# Patient Record
Sex: FEMALE | Race: WHITE | NOT HISPANIC OR LATINO | Employment: FULL TIME | URBAN - METROPOLITAN AREA
[De-identification: names, ages, dates, MRNs, and addresses within clinical notes are randomized per-mention and may not be internally consistent; named-entity substitution may affect disease eponyms.]

---

## 2018-08-01 ENCOUNTER — TELEPHONE (OUTPATIENT)
Dept: HEMATOLOGY/ONCOLOGY | Facility: CLINIC | Age: 33
End: 2018-08-01

## 2018-08-01 NOTE — TELEPHONE ENCOUNTER
From: Alexia Cm   Sent: Friday, August 03, 2018 2:53 PM  To: Lindy Cochran <[email or web address]>; Alma Calixto <[email or web address]>; Toby Lara <[email or web address]>    Pt Melinda Mcarthur was approved for this Consult only. This is a second opinion to her insurance, per Perla.  Anything else the patient's insurance will pay 50%. Patient paid her $50.00 co-pay for service today.    ===========================================  From: Lindy Cochran   Sent: Thursday, August 02, 2018 4:26 PM  To: Alma Calixto <[email or web address]>  Cc: Alexia Cm [email or web address]>  Subject: RE: pre-Auth for Melinda Mcarthur #[id number]    I have talked to the patient, referral coordinator & doctor's nurse (and that's just this afternoon).  I explained to them our protocol.  I explained to the patient that this is how it will be for every  single  service.  Patient will discuss with  and let me know tomorrow.  It sounded like she is still inclined to come here even with requirement of $500 deposit.    --------------------- Message-----  From: Alma Calixto   Sent: Thursday, August 02, 2018 3:18 PM    Accepting a patient whose insurance is not contracted, requires every single service to get a separate approval #.  So we would need referrals like this for every service rendered or we will get a denial.  Patient is aware of center she is contracted with so if she chooses to come here, and does not have the required approvals we need, we must treat as a self-pay.    ---------------------- Message-----  From: Alexia Cm   Sent: Thursday, August 02, 2018 3:14 PM  To: Lindy Cocharn [email or web address]>; Alma Calixto <[email or web address]>  Subject: RE: pre-Auth for Melinda Mcarthur #[id number]    We need a name and reference number from the insurance company sense we know this is out of net benefits, this for consult service only if the  patient needs any more treatment we have to get a approval for that service as well. Without a name and reference number from the insurance company the patient will have to pay $500.00.    ---------------------Message-----  From: Lindy Cochran   Sent: Thursday, August 02, 2018 3:04 PM  To: Alma Calixto [email or web address]>  Cc: Alexia Cm [email or web address]>    Patient called back to report that she spoke with insurance (she didn't get the name) and was told tomorrow's visit is approved.    The bottom of the form does have a pre-auth # and signature...  Please advise    ----------------------Message-----  From: Alma Calixto   Sent: Thursday, August 02, 2018 3:06 PM  To: Lindy Cochran <[email or web address]>  Cc: Alexia Cm [email or web address]>  Subject: RE: pre-Auth for Melinda Mcarthur #[id number]    Alexia    Is the referral acceptable to you?  Usually it's on insurance letter head and states what services are approved, approval # and date range.  Please advise Lindy.  For transplant services, I could not accept an approval in this manner.    Thanks    Alma Calixto  [facility]     ------------------------Message-----  From: Lindy Cochran  Sent: Thursday, August 02, 2018 3:04 PM  To: Alma Calixto [email or web address]>  Cc: Alexia Cm <[email or web address]>    Patient called back to report that she spoke with insurance (she didn't get the name) and was told tomorrow's visit is approved.    The bottom of the form does have a pre-auth # and signature...  Please advise      ----------------------  Message-----  From: Lindy Cochran  Sent: Thursday, August 02, 2018 2:55 PM  To: Alma Calixto [email or web address]>  Cc: Alexia Cm <[email or web address]>  Subject: RE: pre-Auth for Melinda Mcarthur  #[id number]    Per our conversation, Alma, I spoke with the referring office and the patient.  Explained to them that we do not consider this as a guarantee of coverage.  Patient will be considered self-pay and will be required to deposit $500 upfront.  Patient will talk with insurance and make decision.    I will let you know when she gives me an update.      ------------------------- Message-----  From: Alma Calixto  Sent: Thursday, August 02, 2018 2:42 PM  To: [email or web address]>  Cc: Lindy Cochran <[email or web address]>; Alexia Cm [email or web address]>  Subject: RE: pre-Auth for Melinda Mcarthur #[id number]    This is not an approval, only a request to come to [facility].  If it is not approved, patient would be self pay for tomorrow's appt.  Alexia Cm assists with non transplant appts.  -----------------------------------------------------------------------    [Received fax of pre-auth form.  Forwarded to [facility]]    -----------------------------------------------------------------------  ----- -------------      Message from Alma Calixto sent at 8/1/2018  9:50 AM CDT -----    Yes all services would be out of network    ----- ---------------Message -----  From: Lindy Cochran RN  Sent: 8/1/2018   9:39 AM  To: Alma Calixto                                   So even if we just do 2nd opinion (no transplant), a clinic visit would be covered at an out-of-network level?    ----- ----------------Message -----  From: Alma Calixto  Sent: 8/1/2018   9:27 AM  To: BLACK Perdue  This plan in not contracted with [facility].  Per Jennifer ROSS, patient can contact them to ask for single case agreement to be allowed to come her, but her responsibility would be at a higher cost, due to being out of network.  I asked for list of centers that are contracted and Jennifer ROSS stated she can only disclose to  patient.  Alma    ----- ----------------Message -----  From: Lindy Cochran RN  Sent: 8/1/2018   9:18 AM  To: Alma Calixto  Subject: BMT eval                                         Alma Peralta,    pls confirm transplant benefits.  I entered insurance data and card is in media.    Thanks,  Lindy

## 2018-08-01 NOTE — LETTER
Fax Transmission                                                                                                                                                       Date: 2018    -- Slide Request--            for Dr Lara   To:  [facility] From:  Blood & Marrow Transplant              Navigator - Lindy   Fax:  [phone] Fax:       [phone]   Phone:  [phone] Phone:   [phone]     Patient:  ADRIANNE LUU        LEXIE  1985                Accession #: [id number]     Date Collected:  2018           Please send slides to:     [facility]     Leukemia, Blood & Marrow Transplant Program     3rd Floor     [facility]     Attn:  Transplant Coordinator     [address] [place] [address]  [id number]     [employer] Acct code:  [id number]                               Thank you            If there are any problems with this transmission, please call immediately. Thank you.    Confidentiality notice: The accompanying facsimile is intended solely for the use of the recipient designated above. Document(s) transmitted herewith may contain information that is confidential and privileged. Delivery, distribution or dissemination of this communication other than to the intended recipient is strictly prohibited. If you have received this facsimile in error, please notify [facility]'s Corporate Integrity Department immediately by telephone at [phone].

## 2018-08-03 ENCOUNTER — INITIAL CONSULT (OUTPATIENT)
Dept: HEMATOLOGY/ONCOLOGY | Facility: CLINIC | Age: 33
End: 2018-08-03
Payer: COMMERCIAL

## 2018-08-03 VITALS
BODY MASS INDEX: 21 KG/M2 | WEIGHT: 133.81 LBS | DIASTOLIC BLOOD PRESSURE: 70 MMHG | OXYGEN SATURATION: 98 % | HEIGHT: 67 IN | TEMPERATURE: 99 F | SYSTOLIC BLOOD PRESSURE: 124 MMHG

## 2018-08-03 DIAGNOSIS — D47.2 SMOLDERING MULTIPLE MYELOMA: Primary | ICD-10-CM

## 2018-08-03 DIAGNOSIS — E53.8 VITAMIN B12 DEFICIENCY: ICD-10-CM

## 2018-08-03 PROCEDURE — 99999 PR PBB SHADOW E&M-EST. PATIENT-LVL III: CPT | Mod: PBBFAC,,, | Performed by: INTERNAL MEDICINE

## 2018-08-03 PROCEDURE — 99243 OFF/OP CNSLTJ NEW/EST LOW 30: CPT | Mod: S$GLB,,, | Performed by: INTERNAL MEDICINE

## 2018-08-03 NOTE — LETTER
August 7, 2018      Osmar Chi MD  [address]  [address]  [facility]  [place] [address]           [facility]  [facility]  [place] [address]  Phone: [phone]          Patient: Melinda Mcarthur   MR Number: [id number]   YOB: 1985   Date of Visit: 8/3/2018       Dear Dr. Osmar Chi:    Thank you for referring Melinda Mcarthur to me for evaluation. Attached you will find relevant portions of my assessment and plan of care.    If you have questions, please do not hesitate to call me. I look forward to following Melinda Mcarthur along with you.    Sincerely,    Cynthia Ruiz    Enclkatharine  CC:  No Recipients    If you would like to receive this communication electronically, please contact [email or web address] or [phone] to request more information on [employer] Link access.    For providers and/or their staff who would like to refer a patient to [facility], please contact us through our one-stop-shop provider referral line, Sammie Barry, at [phone].    If you feel you have received this communication in error or would no longer like to receive these types of communications, please e-mail [email or web address]

## 2018-08-08 PROBLEM — E53.8 VITAMIN B12 DEFICIENCY: Status: ACTIVE | Noted: 2018-08-08

## 2018-08-08 PROBLEM — D47.2 SMOLDERING MULTIPLE MYELOMA: Status: ACTIVE | Noted: 2018-08-08

## 2018-08-08 NOTE — ASSESSMENT & PLAN NOTE
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     She does need some additional lab work to complete her workup as follows:  - Serum free light chains (notes from Dr. Chi say they were ordered, but they were not made available to me in time for this visit)  - 24-hr urine collection for total protein, UPEP, UIFE  - More sensitive skeletal imaging: either whole body CT, PET/CT or MRI of C,T,L-spine and pelvis.     The above studies may demonstrate findings that could potentially elevate her to symptomatic multiple myeloma.    Her FISH shows a t(14;16) translocation, which is an adverse marker in multiple myeloma. Its signifcance in smoldering myeloma is less clear.    We reviewed that patients with smoldering myeloma are not typically recommended to initiate therapy as the survival benefit is not clear, and early therapy may add toxicity. Currently, with novel therapeutic options, there are clinical trials that are examining the treatment of smoldering myeloma, but we have none that are currently open. One previously reported trial with lenalidomide evaluated the use of lenalidomide in high-risk smoldering myeloma and found a survival benefit, though these patients were defined as having M-protein > 3 g/dl (Brenda et al. NEJM. 2013;369:438-447).     Thus, I do not recommend therapy at this time for Ms. Mcarthur, as the benefit is not clear. We reviewed that the majority of patients with smoldering myeloma will progress to symptomatic multiple myeloma within five years, so vigilant monitoring is warranted. I recommend monitoring her M-protein, light chains, and immunoglobulins, along with other markers of CRAB criteria (except x-rays) every three months. Should she have symptoms, she should be re-evaluated.    Given her young age and adverse genetics, it is fairly certain she will eventually progress. We would  like to see her back at the time she needs initial therapy for multiple myeloma and discuss induction therapy and the role of autologous stem cell transplant.

## 2018-08-08 NOTE — PROGRESS NOTES
HEMATOLOGIC MALIGNANCIES CONSULT NOTE    IDENTIFYING STATEMENT   Melinda Mcarthur (Melinda) is a 33 y.o. female with a  of 1985 from [place], [place], referred by Dr. Osmar Chi for evaluation of smoldering multiple myeloma.     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    She was referred to Dr. Chi's office in 2018 after discovery of a serum monoclonal protein that measured 1.6 g/dl on SPEP (IgG-kappa by GARY). Initial evaluation showed hemoglobin 12.6 g/dl, calcium 9.1 mg/dl, creatinne 0.9 mg/dl. A skeletal survey was negative for lytic lesions. She had bone marrow biopsy on 2018 which showed 10-15% plasma cells by IHC that were kappa-restricted. [place] red was negative for amyloid. FISH showed monosomy 13 and t(14;16).    She presents today for an opinion on her diagnosis and recommendation for management. She is a mother of two children (ages 13 and 2) and concerned about her future both as a nurse practitioner, as a wife, and as a mother.     History reviewed. No pertinent past medical history.    History reviewed. No pertinent family history.    Social History     Social History    Marital status:      Spouse name: N/A    Number of children: N/A    Years of education: N/A     Occupational History    Not on file.     Social History Main Topics    Smoking status: Never Smoker    Smokeless tobacco: Not on file    Alcohol use Not on file    Drug use: Unknown    Sexual activity: Not on file     Other Topics Concern    Not on file     Social History Narrative    No narrative on file         MEDICATIONS:     No current outpatient prescriptions on file prior to visit.     No current facility-administered medications on file prior to visit.        ALLERGIES: Review of patient's allergies indicates:  No Known Allergies     ROS:       Review of Systems  "  Constitutional: Negative for diaphoresis, fatigue, fever and unexpected weight change.   HENT:   Negative for lump/mass and sore throat.    Eyes: Negative for icterus.   Respiratory: Negative for cough and shortness of breath.    Cardiovascular: Negative for chest pain and palpitations.   Gastrointestinal: Negative for abdominal distention, constipation, diarrhea, nausea and vomiting.   Genitourinary: Negative for dysuria and frequency.    Musculoskeletal: Negative for arthralgias, gait problem and myalgias.   Skin: Negative for rash.   Neurological: Negative for dizziness, gait problem and headaches.   Hematological: Negative for adenopathy. Does not bruise/bleed easily.   Psychiatric/Behavioral: The patient is not nervous/anxious.        PHYSICAL EXAM:  Vitals:    08/03/18 1458   BP: 124/70   Temp: 99.2 °F (37.3 °C)   TempSrc: Oral   SpO2: 98%   Weight: 60.7 kg (133 lb 13.1 oz)   Height: 5' 7" (1.702 m)   PainSc: 0-No pain       Physical Exam   Constitutional: She is oriented to person, place, and time. She appears well-developed and well-nourished. No distress.   HENT:   Head: Normocephalic and atraumatic.   Mouth/Throat: Mucous membranes are normal. No oral lesions.   Eyes: Conjunctivae are normal.   Neck: No thyromegaly present.   Cardiovascular: Normal rate, regular rhythm and normal heart sounds.    No murmur heard.  Pulmonary/Chest: Breath sounds normal. She has no wheezes. She has no rales.   Abdominal: Soft. She exhibits no distension and no mass. There is no splenomegaly or hepatomegaly. There is no tenderness.   Lymphadenopathy:     She has no cervical adenopathy.        Right cervical: No deep cervical adenopathy present.       Left cervical: No deep cervical adenopathy present.     She has no axillary adenopathy.        Right: No inguinal adenopathy present.        Left: No inguinal adenopathy present.   Neurological: She is alert and oriented to person, place, and time. She has normal strength and " normal reflexes. No cranial nerve deficit. Coordination normal.   Skin: No rash noted.       LAB: No results found for this or any previous visit.    PROBLEMS ASSESSED THIS VISIT:    1. Smoldering multiple myeloma    2. Vitamin B12 deficiency        IMPRESSION:    1. Smoldering multiple myeloma - see above history    2. 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     She does need some additional lab work to complete her workup as follows:  - Serum free light chains (notes from Dr. Chi say they were ordered, but they were not made available to me in time for this visit)  - 24-hr urine collection for total protein, UPEP, UIFE  - More sensitive skeletal imaging: either whole body CT, PET/CT or MRI of C,T,L-spine and pelvis.     The above studies may demonstrate findings that could potentially elevate her to symptomatic multiple myeloma.    Her FISH shows a t(14;16) translocation, which is an adverse marker in multiple myeloma. Its signifcance in smoldering myeloma is less clear.    We reviewed that patients with smoldering myeloma are not typically recommended to initiate therapy as the survival benefit is not clear, and early therapy may add toxicity. Currently, with novel therapeutic options, there are clinical trials that are examining the treatment of smoldering myeloma, but we have none that are currently open. One previously reported trial with lenalidomide evaluated the use of lenalidomide in high-risk smoldering myeloma and found a survival benefit, though these patients were defined as having M-protein > 3 g/dl (Brenda et al. NEJM. 2013;369:438-447).     Thus, I do not recommend therapy at this time for Ms. Mcarthur, as the benefit is not clear. We reviewed that the majority of patients with smoldering myeloma will progress to  symptomatic multiple myeloma within five years, so vigilant monitoring is warranted. I recommend monitoring her M-protein, light chains, and immunoglobulins, along with other markers of CRAB criteria (except x-rays) every three months. Should she have symptoms, she should be re-evaluated.    Given her young age and adverse genetics, it is fairly certain she will eventually progress. We would like to see her back at the time she needs initial therapy for multiple myeloma and discuss induction therapy and the role of autologous stem cell transplant.    Vitamin B12 deficiency  She reports this has been corrected. She should have B12 rechecked.     Follow-up  - locally with Dr. Chi every three months  - return to [facility] at time of progression for discussion of therapeutic options    Toby Lara MD  Hematology and Stem Cell Transplant

## 2021-11-05 ENCOUNTER — OFFICE VISIT (OUTPATIENT)
Dept: OBSTETRICS AND GYNECOLOGY | Facility: CLINIC | Age: 36
End: 2021-11-05
Payer: COMMERCIAL

## 2021-11-05 VITALS
DIASTOLIC BLOOD PRESSURE: 78 MMHG | WEIGHT: 134 LBS | SYSTOLIC BLOOD PRESSURE: 119 MMHG | HEIGHT: 67 IN | BODY MASS INDEX: 21.03 KG/M2

## 2021-11-05 DIAGNOSIS — Z01.419 ENCOUNTER FOR WELL WOMAN EXAM WITH ROUTINE GYNECOLOGICAL EXAM: Primary | ICD-10-CM

## 2021-11-05 PROBLEM — D47.2 MONOCLONAL GAMMOPATHY OF UNKNOWN SIGNIFICANCE (MGUS): Status: ACTIVE | Noted: 2021-11-05

## 2021-11-05 PROBLEM — R76.8 POSITIVE ANTINUCLEAR ANTIBODY: Status: ACTIVE | Noted: 2021-11-05

## 2021-11-05 PROBLEM — D47.2 SMOLDERING MULTIPLE MYELOMA: Status: ACTIVE | Noted: 2018-07-20

## 2021-11-05 PROBLEM — F98.8 ATTENTION DEFICIT DISORDER (ADD) WITHOUT HYPERACTIVITY: Status: ACTIVE | Noted: 2021-11-05

## 2021-11-05 PROCEDURE — 88175 CYTOPATH C/V AUTO FLUID REDO: CPT | Performed by: NURSE PRACTITIONER

## 2021-11-05 PROCEDURE — 99395 PR PREVENTIVE VISIT,EST,18-39: ICD-10-PCS | Mod: S$GLB,,, | Performed by: NURSE PRACTITIONER

## 2021-11-05 PROCEDURE — 99395 PREV VISIT EST AGE 18-39: CPT | Mod: S$GLB,,, | Performed by: NURSE PRACTITIONER

## 2021-11-05 RX ORDER — LISDEXAMFETAMINE DIMESYLATE 30 MG/1
30 CAPSULE ORAL EVERY MORNING
COMMUNITY
Start: 2021-10-01

## 2021-11-15 LAB
FINAL PATHOLOGIC DIAGNOSIS: NORMAL
Lab: NORMAL

## 2022-02-22 DIAGNOSIS — D84.9 IMMUNOSUPPRESSED STATUS: ICD-10-CM

## 2023-01-30 DIAGNOSIS — N92.1 BREAKTHROUGH BLEEDING ASSOCIATED WITH INTRAUTERINE DEVICE (IUD): Primary | ICD-10-CM

## 2023-01-30 DIAGNOSIS — Z97.5 BREAKTHROUGH BLEEDING ASSOCIATED WITH INTRAUTERINE DEVICE (IUD): Primary | ICD-10-CM

## 2023-11-30 RX ORDER — MEDROXYPROGESTERONE ACETATE 10 MG/1
10 TABLET ORAL NIGHTLY
Qty: 24 TABLET | Refills: 0 | Status: SHIPPED | OUTPATIENT
Start: 2023-11-30 | End: 2023-12-24